# Patient Record
Sex: FEMALE | Race: WHITE | Employment: STUDENT | URBAN - METROPOLITAN AREA
[De-identification: names, ages, dates, MRNs, and addresses within clinical notes are randomized per-mention and may not be internally consistent; named-entity substitution may affect disease eponyms.]

---
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
## 2022-03-30 ENCOUNTER — OFFICE VISIT (OUTPATIENT)
Dept: INTERNAL MEDICINE CLINIC | Facility: CLINIC | Age: 22
End: 2022-03-30
Payer: MEDICAID

## 2022-03-30 VITALS
WEIGHT: 126.69 LBS | HEART RATE: 89 BPM | BODY MASS INDEX: 19.89 KG/M2 | HEIGHT: 67 IN | DIASTOLIC BLOOD PRESSURE: 62 MMHG | SYSTOLIC BLOOD PRESSURE: 100 MMHG | OXYGEN SATURATION: 99 %

## 2022-03-30 DIAGNOSIS — H91.93 DECREASED HEARING OF BOTH EARS: Primary | ICD-10-CM

## 2022-03-30 PROCEDURE — 3074F SYST BP LT 130 MM HG: CPT | Performed by: INTERNAL MEDICINE

## 2022-03-30 PROCEDURE — 3008F BODY MASS INDEX DOCD: CPT | Performed by: INTERNAL MEDICINE

## 2022-03-30 PROCEDURE — 3078F DIAST BP <80 MM HG: CPT | Performed by: INTERNAL MEDICINE

## 2022-03-30 PROCEDURE — 99213 OFFICE O/P EST LOW 20 MIN: CPT | Performed by: INTERNAL MEDICINE

## 2022-05-24 NOTE — TELEPHONE ENCOUNTER
Please review. Protocol failed / No protocol. Requested Prescriptions   Pending Prescriptions Disp Refills    sertraline 50 MG Oral Tab 90 tablet 1     Sig: Take 1 tablet (50 mg total) by mouth daily. Psychiatric Non-Scheduled (Anti-Anxiety) Passed - 5/24/2022  2:59 PM        Passed - Appointment in last 6 or next 3 months           minocycline 100 MG Oral Cap 180 capsule 1     Sig: Take 1 capsule (100 mg total) by mouth 2 (two) times daily.         There is no refill protocol information for this order           Future Appointments         Provider Department Appt Notes    In 1 week PHILLY Nguyen, [address], [employer] care/refills pt of Dr. Conway Distance partner policy informed to mother            Recent Outpatient Visits              1 month ago Decreased hearing of both Bal Jacques MD    Office Visit    1 year ago Encounter for medical examination to \[facility]"" care    [place] [facility], [facility], [address], Cate Da Silva MD    Office Visit    1 year ago Need for vaccination    [facility], [address], [place]    Nurse Only    1 year ago Acne vulgaris    [facility] Yvette Pennington MD    Office Visit    2 years ago Healthy child on routine physical examination    [place] [employer], [facility], [address] [facility], [place], [place]    Office Visit

## 2022-05-25 RX ORDER — MINOCYCLINE HYDROCHLORIDE 100 MG/1
100 CAPSULE ORAL 2 TIMES DAILY
Qty: 180 CAPSULE | Refills: 1 | Status: SHIPPED | OUTPATIENT
Start: 2022-05-25

## 2022-06-02 ENCOUNTER — OFFICE VISIT (OUTPATIENT)
Dept: INTERNAL MEDICINE CLINIC | Facility: CLINIC | Age: 22
End: 2022-06-02
Payer: MEDICAID

## 2022-06-02 VITALS
RESPIRATION RATE: 20 BRPM | DIASTOLIC BLOOD PRESSURE: 72 MMHG | HEIGHT: 67 IN | BODY MASS INDEX: 20.64 KG/M2 | SYSTOLIC BLOOD PRESSURE: 112 MMHG | WEIGHT: 131.5 LBS | HEART RATE: 105 BPM

## 2022-06-02 DIAGNOSIS — L70.9 ACNE, UNSPECIFIED ACNE TYPE: Primary | ICD-10-CM

## 2022-06-02 DIAGNOSIS — F33.40 RECURRENT MAJOR DEPRESSION IN REMISSION (HCC): ICD-10-CM

## 2022-06-02 PROBLEM — Z86.16 HISTORY OF 2019 NOVEL CORONAVIRUS DISEASE (COVID-19): Status: RESOLVED | Noted: 2020-11-27 | Resolved: 2022-06-02

## 2022-06-02 PROBLEM — F32.A DEPRESSION: Status: RESOLVED | Noted: 2020-11-27 | Resolved: 2022-06-02

## 2022-06-02 PROBLEM — Z00.00 ENCOUNTER FOR MEDICAL EXAMINATION TO ESTABLISH CARE: Status: RESOLVED | Noted: 2020-11-27 | Resolved: 2022-06-02

## 2022-06-02 RX ORDER — HYDROXYZINE HYDROCHLORIDE 25 MG/1
25 TABLET, FILM COATED ORAL EVERY 8 HOURS PRN
Qty: 30 TABLET | Refills: 5 | Status: SHIPPED | OUTPATIENT
Start: 2022-06-02

## 2022-06-02 RX ORDER — HYDROXYZINE HYDROCHLORIDE 10 MG/1
25 TABLET, FILM COATED ORAL
COMMUNITY
Start: 2022-01-22 | End: 2022-06-02

## 2022-06-02 RX ORDER — MINOCYCLINE HYDROCHLORIDE 100 MG/1
100 CAPSULE ORAL 2 TIMES DAILY
Qty: 180 CAPSULE | Refills: 1 | Status: SHIPPED | OUTPATIENT
Start: 2022-06-02

## 2022-10-10 DIAGNOSIS — L70.9 ACNE, UNSPECIFIED ACNE TYPE: ICD-10-CM

## 2022-10-10 DIAGNOSIS — F33.40 RECURRENT MAJOR DEPRESSION IN REMISSION (HCC): ICD-10-CM

## 2022-10-10 RX ORDER — HYDROXYZINE HYDROCHLORIDE 25 MG/1
25 TABLET, FILM COATED ORAL EVERY 8 HOURS PRN
Qty: 30 TABLET | Refills: 5 | Status: SHIPPED | OUTPATIENT
Start: 2022-10-10

## 2022-10-10 RX ORDER — MINOCYCLINE HYDROCHLORIDE 100 MG/1
100 CAPSULE ORAL 2 TIMES DAILY
Qty: 180 CAPSULE | Refills: 1 | Status: CANCELLED | OUTPATIENT
Start: 2022-10-10

## 2022-10-10 NOTE — TELEPHONE ENCOUNTER
Protocol failed or has No Protocol, please review  Requested Prescriptions   Pending Prescriptions Disp Refills    minocycline 100 MG Oral Cap 180 capsule 1     Sig: Take 1 capsule (100 mg total) by mouth 2 (two) times daily. There is no refill protocol information for this order        hydrOXYzine 25 MG Oral Tab 30 tablet 5     Sig: Take 1 tablet (25 mg total) by mouth every 8 (eight) hours as needed.         There is no refill protocol information for this order           Recent Outpatient Visits              4 months ago Acne, unspecified acne type    [facility], [address], Jessica Jane MD    Office Visit    6 months ago Decreased hearing of both Daniel Miles MD    Office Visit    1 year ago Encounter for medical examination to [facility], [address], [place]    Office Visit    2 years ago Need for vaccination    [facility], [address], [place]    Nurse Only    2 years ago Acne vulgaris    [facility] Eimly Gomez MD    Office Visit

## 2023-01-24 DIAGNOSIS — F33.40 RECURRENT MAJOR DEPRESSION IN REMISSION (HCC): ICD-10-CM

## 2023-01-25 NOTE — TELEPHONE ENCOUNTER
Dr Loza=please disregard, wrong sent. Re sent to the right provider-Dr Sharon Simental. CSS=please call and assists patient to schedule for FU OV for further medication refills. No future appointments. Please review; protocol failed/no protocol. Requested Prescriptions   Pending Prescriptions Disp Refills    sertraline 50 MG Oral Tab 135 tablet 1     Sig: Take 1.5 tablets (75 mg total) by mouth daily.        Psychiatric Non-Scheduled (Anti-Anxiety) Failed - 1/24/2023 11:18 AM        Failed - In person appointment or virtual visit in the past 6 mos or appointment in next 3 mos     Recent Outpatient Visits              7 months ago Acne, unspecified acne type    [facility], [address], Tulio Ewing MD    Office Visit    10 months ago Decreased hearing of both Chelsi Swedish, [address], MD Philipp    Office Visit    2 years ago Encounter for medical examination to establish care    Jaden Joaquin, Gallo Delgado MD    Office Visit    2 years ago Need for vaccination    [address], [address], [place]    Nurse Only    2 years ago Acne vulgaris    Sandra Gutierrez, [facility], MD    Office Visit                            Recent Outpatient Visits              7 months ago Acne, unspecified acne type    [address], [address], Tulio Ewing MD    Office Visit    10 months ago Decreased hearing of both Chelsi Swedish, [address], MD Philipp    Office Visit    2 years ago Encounter for medical examination to establish care    Jaden Joaquin, Gallo Delgado MD    Office Visit    2 years ago Need for vaccination    [address], [address], [place]    Nurse Only    2 years ago Acne vulgaris    [facility] Jennifer Taylor, Damien Huff MD    Office Visit

## 2023-07-10 NOTE — LETTER
Case discussed with [facility]  Luz Maria ([phone]).  Luz Maria explained that auth requests for inpatient admission and transport need to be separate, she will use the request submitted by this RN CM for the inpatient admission to [facility].  Case ID for this request will be [id number].  RN CM will need to submit another request for the air transport.  Luz Maria reported that air transport will likely be denied.    RN CM attempted to submit the auth request for air transport, but the Avail[employer] portal was down again.  KRISTOPHER TUTTLE notified Luz Maria at [facility].  RN CM also notified [facility] KRISTOPHER Vera.       VACCINE ADMINISTRATION RECORD  PARENT / GUARDIAN APPROVAL  Date: 3/9/2020  Vaccine administered to: Taya Bains     : 2000    MRN: [id number]    A copy of the appropriate Centers for Disease Control and Prevention Vaccine Information statement

## 2024-05-24 ENCOUNTER — OFFICE VISIT (OUTPATIENT)
Dept: OTOLARYNGOLOGY | Facility: CLINIC | Age: 24
End: 2024-05-24

## 2024-05-24 DIAGNOSIS — R07.0 THROAT PAIN: Primary | ICD-10-CM

## 2024-05-24 PROCEDURE — 31575 DIAGNOSTIC LARYNGOSCOPY: CPT | Performed by: OTOLARYNGOLOGY

## 2024-05-24 PROCEDURE — 99203 OFFICE O/P NEW LOW 30 MIN: CPT | Performed by: OTOLARYNGOLOGY

## 2024-05-24 RX ORDER — MONTELUKAST SODIUM 10 MG/1
10 TABLET ORAL NIGHTLY
Qty: 30 TABLET | Refills: 3 | Status: SHIPPED | OUTPATIENT
Start: 2024-05-24

## 2024-05-24 RX ORDER — AZELASTINE 1 MG/ML
2 SPRAY, METERED NASAL 2 TIMES DAILY
Qty: 30 ML | Refills: 0 | Status: SHIPPED | OUTPATIENT
Start: 2024-05-24

## 2024-05-24 NOTE — PROGRESS NOTES
Bre Maynard is a 24 year old female.    Chief Complaint   Patient presents with    Tonsil Problem     Reports enlarged tonsils, when enlarged feels like swallowing glass       HISTORY OF PRESENT ILLNESS  She presents with a history of being in [place] for college returned recently.  States that while she was out there very affected by pollen.  Developed sore throat went to immediate care at the college was started on antibiotics despite negative COVID and strep testing with no help in her symptoms throat pain continues.  Significant allergy history had some postnasal discharge while throat clearing and cough.  No fevers or other associated signs or symptoms.  She does smoke and vape and has been doing so consistently throughout this entire process.  Here for evaluation and management      Social History     Socioeconomic History    Marital status: Single   Tobacco Use    Smoking status: Never    Smokeless tobacco: Never   Vaping Use    Vaping status: Some Days    Substances: Nicotine, CBD   Substance and Sexual Activity    Alcohol use: Yes    Drug use: Yes     Types: Cannabis    Sexual activity: Yes     Birth control/protection: Condom, Pill   Other Topics Concern    Second-hand smoke exposure No    Alcohol/drug concerns No    Violence concerns No    Pt has a pacemaker No    Pt has a defibrillator No    Reaction to local anesthetic No       Family History   Problem Relation Age of Onset    Migraines Mother     Alcohol and Other Disorders Associated Father     Diabetes Maternal Grandmother     Glaucoma Maternal Grandmother     Heart Disease Maternal Grandfather     Heart Disorder Maternal Grandfather     Heart Disease Paternal Grandfather     Cancer Neg     Hypertension Neg        Past Medical History:    Acute bronchospasm    Congenital hypertrophy of retinal pigment epithelium    per NG; Congenital hypertrophy of RPE    Gross hematuria    Hyperopia    Subjective vision disturbance       History reviewed. No  pertinent surgical history.      REVIEW OF SYSTEMS    System Neg/Pos Details   Constitutional Negative Fatigue, fever and weight loss.   ENMT Negative Drooling.   Eyes Negative Blurred vision and vision changes.   Respiratory Negative Dyspnea and wheezing.   Cardio Negative Chest pain, irregular heartbeat/palpitations and syncope.   GI Negative Abdominal pain and diarrhea.   Endocrine Negative Cold intolerance and heat intolerance.   Neuro Negative Tremors.   Psych Negative Anxiety and depression.   Integumentary Negative Frequent skin infections, pigment change and rash.   Hema/Lymph Negative Easy bleeding and easy bruising.           PHYSICAL EXAM    There were no vitals taken for this visit.       Constitutional Normal Overall appearance - Normal.   Psychiatric Normal Orientation - Oriented to time, place, person & situation. Appropriate mood and affect.   Neck Exam Normal Inspection - Normal. Palpation - Normal. Parotid gland - Normal. Thyroid gland - Normal.   Eyes Normal Conjunctiva - Right: Normal, Left: Normal. Pupil - Right: Normal, Left: Normal. Fundus - Right: Normal, Left: Normal.   Neurological Normal Memory - Normal. Cranial nerves - Cranial nerves II through XII grossly intact.   Head/Face Normal Facial features - Normal. Eyebrows - Normal. Skull - Normal.        Nasopharynx Normal External nose - Normal. Lips/teeth/gums - Normal. Tonsils - Normal. Oropharynx - Normal.   Ears Normal Inspection - Right: Normal, Left: Normal. Canal - Right: Normal, Left: Normal. TM - Right: Normal, Left: Normal.   Skin Normal Inspection - Normal.        Lymph Detail Normal Submental. Submandibular. Anterior cervical. Posterior cervical. Supraclavicular.        Nose/Mouth/Throat Normal External nose - Normal. Lips/teeth/gums - Normal. Tonsils - Normal. Oropharynx -posterior pharyngeal erythema   Nose/Mouth/Throat Normal Nares - Right: Normal Left: Normal. Septum -Normal  Turbinates - Right: Normal, Left: Normal.  Nasal  mucosa congested   Procedures:  Endoscopy/Laryngoscopy  Pre-Procedure Care: Verbal consent was obtained. Procedure/risks were explained. Questions were answered. Correct patient identified. Correct side and site confirmed.      A topical spray of ).25% Neosynephrine was sprayed into the nose.    Laryngoscopy:  Flexible Fiberoptic Laryngoscopy: A diagnostic flexible fiberoptic laryngoscopy was performed. The flexible fiberoptic laryngoscope was placed into the nose or mouthand advanced  into the interior of the larynx. A thorough examination of the interior of the larynx was performed.   Findings were as follows.       Hypopharynx/Larynx:  Epiglottis is normal.  Arytenoids:  Bilateral: Arytenoids are normal.  Vocal folds-false  Bilateral: Vocal folds (false) are normal.  Vocal folds-true  Bilateral: Vocal folds (true) are normal.  Pyriform sinus:  Bilateral: Pyriform sinuses are normal.  Base of tongue is normal in appearance.  There is no airway obstruction.   General comments: Erythema of the laryngeal structures no lesions masses polyps or nodules.              Current Outpatient Medications:     loratadine-pseudoephedrine ER 5-120 MG Oral Tablet 12 Hr, Take 1 tablet by mouth every 12 (twelve) hours., Disp: 60 tablet, Rfl: 3    montelukast 10 MG Oral Tab, Take 1 tablet (10 mg total) by mouth nightly., Disp: 30 tablet, Rfl: 3    azelastine 0.1 % Nasal Solution, 2 sprays by Nasal route 2 (two) times daily., Disp: 30 mL, Rfl: 0    sertraline 50 MG Oral Tab, Take 1.5 tablets (75 mg total) by mouth daily., Disp: 145 tablet, Rfl: 1    hydrOXYzine 25 MG Oral Tab, Take 1 tablet (25 mg total) by mouth every 8 (eight) hours as needed., Disp: 30 tablet, Rfl: 5    Norgestimate-Eth Estradiol (JOHN) 0.25-35 MG-MCG Oral Tab, Take 1 tablet by mouth daily., Disp: 84 tablet, Rfl: 0    minocycline 100 MG Oral Cap, Take 1 capsule (100 mg total) by mouth 2 (two) times daily. (Patient not taking: Reported on 5/24/2024), Disp: 180  capsule, Rfl: 1  ASSESSMENT AND PLAN    1. Throat pain  Laryngoscopy reveals erythema of the laryngeal structures but no lesions masses polyps or nodules.  I did recommend that she stop vaping and smoking altogether and to start Claritin-D Singulair Astelin nasal spray.  She will return to see me in 1 month for reevaluation.  May be able to wean over the summer months but most likely will require these meds in the allergy months here in [facility].  - loratadine-pseudoephedrine ER 5-120 MG Oral Tablet 12 Hr; Take 1 tablet by mouth every 12 (twelve) hours.  Dispense: 60 tablet; Refill: 3  - LARYNGOSCOPY,FLEX FIBER,DIAGNOSTIC        This note was prepared using Dragon Medical voice recognition dictation software. As a result errors may occur. When identified these errors have been corrected. While every attempt is made to correct errors during dictation discrepancies may still exist    Martin Obregon MD    5/24/2024    2:32 PM

## 2024-07-25 ENCOUNTER — APPOINTMENT (OUTPATIENT)
Dept: URBAN - METROPOLITAN AREA CLINIC 244 | Age: 24
Setting detail: DERMATOLOGY
End: 2024-07-25

## 2024-07-25 DIAGNOSIS — L70.0 ACNE VULGARIS: ICD-10-CM

## 2024-07-25 PROCEDURE — OTHER PRESCRIPTION MEDICATION MANAGEMENT: OTHER

## 2024-07-25 PROCEDURE — OTHER COUNSELING: OTHER

## 2024-07-25 PROCEDURE — OTHER PRESCRIPTION: OTHER

## 2024-07-25 PROCEDURE — 99204 OFFICE O/P NEW MOD 45 MIN: CPT

## 2024-07-25 RX ORDER — SPIRONOLACTONE 100 MG/1
TABLET, FILM COATED ORAL
Qty: 30 | Refills: 2 | Status: ERX | COMMUNITY
Start: 2024-07-25

## 2024-07-25 RX ORDER — TRETINOIN 0.6 MG/G
GEL TOPICAL
Qty: 50 | Refills: 11 | Status: ERX | COMMUNITY
Start: 2024-07-25

## 2024-07-25 RX ORDER — CLINDAMYCIN PHOSPHATE 10 MG/ML
LOTION TOPICAL DAILY
Qty: 60 | Refills: 11 | Status: ERX | COMMUNITY
Start: 2024-07-25

## 2024-07-25 ASSESSMENT — LOCATION DETAILED DESCRIPTION DERM
LOCATION DETAILED: LEFT CHIN
LOCATION DETAILED: GLABELLA

## 2024-07-25 ASSESSMENT — LOCATION ZONE DERM: LOCATION ZONE: FACE

## 2024-07-25 ASSESSMENT — LOCATION SIMPLE DESCRIPTION DERM
LOCATION SIMPLE: CHIN
LOCATION SIMPLE: GLABELLA

## 2024-07-25 NOTE — PROCEDURE: PRESCRIPTION MEDICATION MANAGEMENT
Render In Strict Bullet Format?: No
Detail Level: Zone
Modify Regimen: Patient previously on Spironolactone 50mg —> increase to 100mg
Continue Regimen: Retin-A 0.06% QHS & Clindamycin 1% QAM

## 2024-07-25 NOTE — PROCEDURE: COUNSELING
Aklief counseling:  Patient advised to apply a pea-sized amount only at bedtime and wait 30 minutes after washing their face before applying.  If too drying, patient may add a non-comedogenic moisturizer.  The most commonly reported side effects including irritation, redness, scaling, dryness, stinging, burning, itching, and increased risk of sunburn.  The patient verbalized understanding of the proper use and possible adverse effects of retinoids.  All of the patient's questions and concerns were addressed.
Bpo Recommendations: Recommend panoxyl or cerave 4% BPO wash on face for 30-45 seconds daily or as tolerated (reduce use if drying/irritating to face). Can bleach fabrics/hair. Recommend 10% BPO wash (i.e panoxyl) for acne on chest/back for 2-3 minutes daily, adjusting frequency/duration as tolerated.
Minocycline Pregnancy And Lactation Text: This medication is Pregnancy Category D and not consider safe during pregnancy. It is also excreted in breast milk.
Topical Retinoid Pregnancy And Lactation Text: This medication is Pregnancy Category C. It is unknown if this medication is excreted in breast milk.
Winlevi Counseling:  I discussed with the patient the risks of topical clascoterone including but not limited to erythema, scaling, itching, and stinging. Patient voiced their understanding.
Erythromycin Counseling:  I discussed with the patient the risks of erythromycin including but not limited to GI upset, allergic reaction, drug rash, diarrhea, increase in liver enzymes, and yeast infections.
Sunscreen Recommendations: [place] roche posay or EltaMD are well tolerated sunscreens.
Bactrim Pregnancy And Lactation Text: This medication is Pregnancy Category D and is known to cause fetal risk.  It is also excreted in breast milk.
Azelaic Acid Counseling: Patient counseled that medicine may cause skin irritation and to avoid applying near the eyes.  In the event of skin irritation, the patient was advised to reduce the amount of the drug applied or use it less frequently.   The patient verbalized understanding of the proper use and possible adverse effects of azelaic acid.  All of the patient's questions and concerns were addressed.
Doxycycline Counseling:  Patient counseled regarding possible photosensitivity and increased risk for sunburn.  Patient instructed to avoid sunlight, if possible.  When exposed to sunlight, patients should wear protective clothing, sunglasses, and sunscreen.  The patient was instructed to call the office immediately if the following severe adverse effects occur:  hearing changes, easy bruising/bleeding, severe headache, or vision changes.  The patient verbalized understanding of the proper use and possible adverse effects of doxycycline.  All of the patient's questions and concerns were addressed.
Azithromycin Pregnancy And Lactation Text: This medication is considered safe during pregnancy and is also secreted in breast milk.
High Dose Vitamin A Pregnancy And Lactation Text: High dose vitamin A therapy is contraindicated during pregnancy and breast feeding.
Detail Level: Zone
Tazorac Pregnancy And Lactation Text: This medication is not safe during pregnancy. It is unknown if this medication is excreted in breast milk.
Benzoyl Peroxide Counseling: Patient counseled that medicine may cause skin irritation and bleach clothing.  In the event of skin irritation, the patient was advised to reduce the amount of the drug applied or use it less frequently.   The patient verbalized understanding of the proper use and possible adverse effects of benzoyl peroxide.  All of the patient's questions and concerns were addressed.
Topical Clindamycin Pregnancy And Lactation Text: This medication is Pregnancy Category B and is considered safe during pregnancy. It is unknown if it is excreted in breast milk.
Spironolactone Pregnancy And Lactation Text: This medication can cause feminization of the male fetus and should be avoided during pregnancy. The active metabolite is also found in breast milk.
Dapsone Counseling: I discussed with the patient the risks of dapsone including but not limited to hemolytic anemia, agranulocytosis, rashes, methemoglobinemia, kidney failure, peripheral neuropathy, headaches, GI upset, and liver toxicity.  Patients who start dapsone require monitoring including baseline LFTs and weekly CBCs for the first month, then every month thereafter.  The patient verbalized understanding of the proper use and possible adverse effects of dapsone.  All of the patient's questions and concerns were addressed.
Isotretinoin Pregnancy And Lactation Text: This medication is Pregnancy Category X and is considered extremely dangerous during pregnancy. It is unknown if it is excreted in breast milk.
Topical Retinoid counseling:  Patient advised to apply a pea-sized amount only at bedtime and wait 30 minutes after washing their face before applying.  If too drying, patient may add a non-comedogenic moisturizer. The patient verbalized understanding of the proper use and possible adverse effects of retinoids.  All of the patient's questions and concerns were addressed.
Topical Sulfur Applications Pregnancy And Lactation Text: This medication is Pregnancy Category C and has an unknown safety profile during pregnancy. It is unknown if this topical medication is excreted in breast milk.
Birth Control Pills Counseling: Birth Control Pill Counseling: I discussed with the patient the potential side effects of OCPs including but not limited to increased risk of stroke, heart attack, thrombophlebitis, deep venous thrombosis, hepatic adenomas, breast changes, GI upset, headaches, and depression.  The patient verbalized understanding of the proper use and possible adverse effects of OCPs. All of the patient's questions and concerns were addressed.
Erythromycin Pregnancy And Lactation Text: This medication is Pregnancy Category B and is considered safe during pregnancy. It is also excreted in breast milk.
Include Pregnancy/Lactation Warning?: No
Sarecycline Counseling: Patient advised regarding possible photosensitivity and discoloration of the teeth, skin, lips, tongue and gums.  Patient instructed to avoid sunlight, if possible.  When exposed to sunlight, patients should wear protective clothing, sunglasses, and sunscreen.  The patient was instructed to call the office immediately if the following severe adverse effects occur:  hearing changes, easy bruising/bleeding, severe headache, or vision changes.  The patient verbalized understanding of the proper use and possible adverse effects of sarecycline.  All of the patient's questions and concerns were addressed.
Minocycline Counseling: Patient advised regarding possible photosensitivity and discoloration of the teeth, skin, lips, tongue and gums.  Patient instructed to avoid sunlight, if possible.  When exposed to sunlight, patients should wear protective clothing, sunglasses, and sunscreen.  The patient was instructed to call the office immediately if the following severe adverse effects occur:  hearing changes, easy bruising/bleeding, severe headache, or vision changes.  The patient verbalized understanding of the proper use and possible adverse effects of minocycline.  All of the patient's questions and concerns were addressed.
Doxycycline Pregnancy And Lactation Text: This medication is Pregnancy Category D and not consider safe during pregnancy. It is also excreted in breast milk but is considered safe for shorter treatment courses.
Tazorac Counseling:  Patient advised that medication is irritating and drying.  Patient may need to apply sparingly and wash off after an hour before eventually leaving it on overnight.  The patient verbalized understanding of the proper use and possible adverse effects of tazorac.  All of the patient's questions and concerns were addressed.
Winlevi Pregnancy And Lactation Text: This medication is considered safe during pregnancy and breastfeeding.
Aklief Pregnancy And Lactation Text: It is unknown if this medication is safe to use during pregnancy.  It is unknown if this medication is excreted in breast milk.  Breastfeeding women should use the topical cream on the smallest area of the skin for the shortest time needed while breastfeeding.  Do not apply to nipple and areola.
Spironolactone Counseling: Patient advised regarding risks of diarrhea, abdominal pain, hyperkalemia, birth defects (for female patients), liver toxicity and renal toxicity. The patient may need blood work to monitor liver and kidney function and potassium levels while on therapy. The patient verbalized understanding of the proper use and possible adverse effects of spironolactone.  All of the patient's questions and concerns were addressed.
Bactrim Counseling:  I discussed with the patient the risks of sulfa antibiotics including but not limited to GI upset, allergic reaction, drug rash, diarrhea, dizziness, photosensitivity, and yeast infections.  Rarely, more serious reactions can occur including but not limited to aplastic anemia, agranulocytosis, methemoglobinemia, blood dyscrasias, liver or kidney failure, lung infiltrates or desquamative/blistering drug rashes.
Topical Clindamycin Counseling: Patient counseled that this medication may cause skin irritation or allergic reactions.  In the event of skin irritation, the patient was advised to reduce the amount of the drug applied or use it less frequently.   The patient verbalized understanding of the proper use and possible adverse effects of clindamycin.  All of the patient's questions and concerns were addressed.
Azelaic Acid Pregnancy And Lactation Text: This medication is considered safe during pregnancy and breast feeding.
High Dose Vitamin A Counseling: Side effects reviewed, pt to contact office should one occur.
Azithromycin Counseling:  I discussed with the patient the risks of azithromycin including but not limited to GI upset, allergic reaction, drug rash, diarrhea, and yeast infections.
Dapsone Pregnancy And Lactation Text: This medication is Pregnancy Category C and is not considered safe during pregnancy or breast feeding.
Tetracycline Counseling: Patient counseled regarding possible photosensitivity and increased risk for sunburn.  Patient instructed to avoid sunlight, if possible.  When exposed to sunlight, patients should wear protective clothing, sunglasses, and sunscreen.  The patient was instructed to call the office immediately if the following severe adverse effects occur:  hearing changes, easy bruising/bleeding, severe headache, or vision changes.  The patient verbalized understanding of the proper use and possible adverse effects of tetracycline.  All of the patient's questions and concerns were addressed. Patient understands to avoid pregnancy while on therapy due to potential birth defects.
Topical Retinoids Recommendations: Rec OTC adapalene gel 0.1% (i.e differin) if Rx is not covered.
Benzoyl Peroxide Pregnancy And Lactation Text: This medication is Pregnancy Category C. It is unknown if benzoyl peroxide is excreted in breast milk.
Topical Sulfur Applications Counseling: Topical Sulfur Counseling: Patient counseled that this medication may cause skin irritation or allergic reactions.  In the event of skin irritation, the patient was advised to reduce the amount of the drug applied or use it less frequently.   The patient verbalized understanding of the proper use and possible adverse effects of topical sulfur application.  All of the patient's questions and concerns were addressed.
Isotretinoin Counseling: Patient should get monthly blood tests, not donate blood, not drive at night if vision affected, not share medication, and not undergo elective surgery for 6 months after tx completed. Side effects reviewed, pt to contact office should one occur.
Birth Control Pills Pregnancy And Lactation Text: This medication should be avoided if pregnant and for the first 30 days post-partum.

## 2024-10-28 ENCOUNTER — APPOINTMENT (OUTPATIENT)
Dept: URBAN - METROPOLITAN AREA CLINIC 244 | Age: 24
Setting detail: DERMATOLOGY
End: 2024-10-28

## 2024-10-28 DIAGNOSIS — L70.0 ACNE VULGARIS: ICD-10-CM

## 2024-10-28 PROCEDURE — OTHER DIAGNOSIS COMMENT: OTHER

## 2024-10-28 PROCEDURE — OTHER PRESCRIPTION: OTHER

## 2024-10-28 PROCEDURE — OTHER PRESCRIPTION MEDICATION MANAGEMENT: OTHER

## 2024-10-28 PROCEDURE — OTHER MIPS QUALITY: OTHER

## 2024-10-28 PROCEDURE — 99214 OFFICE O/P EST MOD 30 MIN: CPT

## 2024-10-28 PROCEDURE — OTHER COUNSELING: OTHER

## 2024-10-28 RX ORDER — TRETIONIN 0.25 MG/G
CREAM TOPICAL QHS
Qty: 45 | Refills: 11 | Status: ERX | COMMUNITY
Start: 2024-10-28

## 2024-10-28 RX ORDER — SPIRONOLACTONE 100 MG/1
TABLET, FILM COATED ORAL
Qty: 30 | Refills: 3 | Status: ERX

## 2024-10-28 RX ORDER — CLINDAMYCIN PHOSPHATE 10 MG/ML
LOTION TOPICAL DAILY
Qty: 60 | Refills: 11 | Status: ACTIVE

## 2024-10-28 ASSESSMENT — LOCATION DETAILED DESCRIPTION DERM
LOCATION DETAILED: GLABELLA
LOCATION DETAILED: LEFT CHIN

## 2024-10-28 ASSESSMENT — LOCATION SIMPLE DESCRIPTION DERM
LOCATION SIMPLE: CHIN
LOCATION SIMPLE: GLABELLA

## 2024-10-28 ASSESSMENT — LOCATION ZONE DERM: LOCATION ZONE: FACE

## 2024-10-28 NOTE — PROCEDURE: PRESCRIPTION MEDICATION MANAGEMENT
Initiate Treatment: [id number]\\ntopical tret 0.025%
Render In Strict Bullet Format?: No
Detail Level: Zone

## 2024-10-28 NOTE — PROCEDURE: DIAGNOSIS COMMENT
Detail Level: Simple
Render Risk Assessment In Note?: no
Comment: Will consider triple cream at next visit in about 6 weeks for glabella, chin

## 2024-12-11 ENCOUNTER — APPOINTMENT (OUTPATIENT)
Dept: URBAN - METROPOLITAN AREA CLINIC 244 | Age: 24
Setting detail: DERMATOLOGY
End: 2024-12-11

## 2024-12-11 DIAGNOSIS — D485 NEOPLASM OF UNCERTAIN BEHAVIOR OF SKIN: ICD-10-CM

## 2024-12-11 DIAGNOSIS — L90.5 SCAR CONDITIONS AND FIBROSIS OF SKIN: ICD-10-CM

## 2024-12-11 DIAGNOSIS — L70.0 ACNE VULGARIS: ICD-10-CM

## 2024-12-11 DIAGNOSIS — L72.0 EPIDERMAL CYST: ICD-10-CM

## 2024-12-11 PROBLEM — D48.5 NEOPLASM OF UNCERTAIN BEHAVIOR OF SKIN: Status: ACTIVE | Noted: 2024-12-11

## 2024-12-11 PROCEDURE — OTHER DIAGNOSIS COMMENT: OTHER

## 2024-12-11 PROCEDURE — 99213 OFFICE O/P EST LOW 20 MIN: CPT | Mod: 25

## 2024-12-11 PROCEDURE — OTHER BIOPSY BY SHAVE METHOD: OTHER

## 2024-12-11 PROCEDURE — OTHER PRESCRIPTION MEDICATION MANAGEMENT: OTHER

## 2024-12-11 PROCEDURE — 11102 TANGNTL BX SKIN SINGLE LES: CPT

## 2024-12-11 PROCEDURE — OTHER COUNSELING: OTHER

## 2024-12-11 ASSESSMENT — LOCATION DETAILED DESCRIPTION DERM
LOCATION DETAILED: GLABELLA
LOCATION DETAILED: LEFT CHIN
LOCATION DETAILED: NASAL DORSUM
LOCATION DETAILED: EPIGASTRIC SKIN
LOCATION DETAILED: RIGHT SUPERIOR MEDIAL MALAR CHEEK

## 2024-12-11 ASSESSMENT — LOCATION SIMPLE DESCRIPTION DERM
LOCATION SIMPLE: RIGHT CHEEK
LOCATION SIMPLE: GLABELLA
LOCATION SIMPLE: ABDOMEN
LOCATION SIMPLE: NOSE
LOCATION SIMPLE: CHIN

## 2024-12-11 ASSESSMENT — LOCATION ZONE DERM
LOCATION ZONE: NOSE
LOCATION ZONE: FACE
LOCATION ZONE: TRUNK

## 2024-12-11 NOTE — PROCEDURE: PRESCRIPTION MEDICATION MANAGEMENT
Render In Strict Bullet Format?: No
Detail Level: Zone
Continue Regimen: spironolactone 100 mg tablet \\nQuantity: 30.0 Tablet  Days Supply: 30\\nSig: Take one tablet by mouth once a day\\n\\nclindamycin 1 % lotion daily\\nQuantity: 60.0 ml\\nSig: Apply to acne prone area every morning\\n\\ntretinoin 0.025 % topical cream QHS\\nQuantity: 45.0 g  Days Supply: 30\\nSig: Apply pea size amount to prevent acne in acne-prone areas, at night.

## 2024-12-11 NOTE — PROCEDURE: BIOPSY BY SHAVE METHOD
Type Of Destruction Used: Curettage
Hide Anticipated Plan (Based On Presumed Biopsy Results)?: No
Billing Type: Third-Party Bill
Electrodesiccation And Curettage Text: The wound bed was treated with electrodesiccation and curettage after the biopsy was performed.
Consent: Written consent was obtained and risks were reviewed including but not limited to scarring, infection, bleeding, scabbing, incomplete removal, nerve damage and allergy to anesthesia.
Post-Care Instructions: I reviewed with the patient in detail post-care instructions. Patient is to keep the biopsy site dry overnight, and then apply bacitracin twice daily until healed. Patient may apply hydrogen peroxide soaks to remove any crusting.
Detail Level: Detailed
Cryotherapy Text: The wound bed was treated with cryotherapy after the biopsy was performed.
Curettage Text: The wound bed was treated with curettage after the biopsy was performed.
Silver Nitrate Text: The wound bed was treated with silver nitrate after the biopsy was performed.
Was A Bandage Applied: Yes
Depth Of Biopsy: dermis
Lab: [id number]
Biopsy Type: H and E
Anesthesia Volume In Cc: 0.5
Additional Anesthesia Volume In Cc (Will Not Render If 0): 0
Information: Selecting Yes will display possible errors in your note based on the variables you have selected. This validation is only offered as a suggestion for you. PLEASE NOTE THAT THE VALIDATION TEXT WILL BE REMOVED WHEN YOU FINALIZE YOUR NOTE. IF YOU WANT TO FAX A PRELIMINARY NOTE YOU WILL NEED TO TOGGLE THIS TO 'NO' IF YOU DO NOT WANT IT IN YOUR FAXED NOTE.
Wound Care: Petrolatum
Anesthesia Type: 0.5% lidocaine with 1:100,000 epinephrine and a 1:10 solution of 8.4% sodium bicarbonate
Biopsy Method: double edge Personna blade
Notification Instructions: Patient will be notified of biopsy results. However, patient instructed to call the office if not contacted within 2 weeks.
Hemostasis: Drysol
Dressing: bandage
Electrodesiccation Text: The wound bed was treated with electrodesiccation after the biopsy was performed.

## 2024-12-11 NOTE — PROCEDURE: COUNSELING
Aklief counseling:  Patient advised to apply a pea-sized amount only at bedtime and wait 30 minutes after washing their face before applying.  If too drying, patient may add a non-comedogenic moisturizer.  The most commonly reported side effects including irritation, redness, scaling, dryness, stinging, burning, itching, and increased risk of sunburn.  The patient verbalized understanding of the proper use and possible adverse effects of retinoids.  All of the patient's questions and concerns were addressed.
Bpo Recommendations: Recommend panoxyl or cerave 4% BPO wash on face for 30-45 seconds daily or as tolerated (reduce use if drying/irritating to face). Can bleach fabrics/hair. Recommend 10% BPO wash (i.e panoxyl) for acne on chest/back for 2-3 minutes daily, adjusting frequency/duration as tolerated.
Minocycline Pregnancy And Lactation Text: This medication is Pregnancy Category D and not consider safe during pregnancy. It is also excreted in breast milk.
Topical Retinoid Pregnancy And Lactation Text: This medication is Pregnancy Category C. It is unknown if this medication is excreted in breast milk.
Winlevi Counseling:  I discussed with the patient the risks of topical clascoterone including but not limited to erythema, scaling, itching, and stinging. Patient voiced their understanding.
Erythromycin Counseling:  I discussed with the patient the risks of erythromycin including but not limited to GI upset, allergic reaction, drug rash, diarrhea, increase in liver enzymes, and yeast infections.
Sunscreen Recommendations: La roche posay or EltaMD are well tolerated sunscreens.
Bactrim Pregnancy And Lactation Text: This medication is Pregnancy Category D and is known to cause fetal risk.  It is also excreted in breast milk.
Azelaic Acid Counseling: Patient counseled that medicine may cause skin irritation and to avoid applying near the eyes.  In the event of skin irritation, the patient was advised to reduce the amount of the drug applied or use it less frequently.   The patient verbalized understanding of the proper use and possible adverse effects of azelaic acid.  All of the patient's questions and concerns were addressed.
Doxycycline Counseling:  Patient counseled regarding possible photosensitivity and increased risk for sunburn.  Patient instructed to avoid sunlight, if possible.  When exposed to sunlight, patients should wear protective clothing, sunglasses, and sunscreen.  The patient was instructed to call the office immediately if the following severe adverse effects occur:  hearing changes, easy bruising/bleeding, severe headache, or vision changes.  The patient verbalized understanding of the proper use and possible adverse effects of doxycycline.  All of the patient's questions and concerns were addressed.
Azithromycin Pregnancy And Lactation Text: This medication is considered safe during pregnancy and is also secreted in breast milk.
High Dose Vitamin A Pregnancy And Lactation Text: High dose vitamin A therapy is contraindicated during pregnancy and breast feeding.
Detail Level: Zone
Tazorac Pregnancy And Lactation Text: This medication is not safe during pregnancy. It is unknown if this medication is excreted in breast milk.
Benzoyl Peroxide Counseling: Patient counseled that medicine may cause skin irritation and bleach clothing.  In the event of skin irritation, the patient was advised to reduce the amount of the drug applied or use it less frequently.   The patient verbalized understanding of the proper use and possible adverse effects of benzoyl peroxide.  All of the patient's questions and concerns were addressed.
Topical Clindamycin Pregnancy And Lactation Text: This medication is Pregnancy Category B and is considered safe during pregnancy. It is unknown if it is excreted in breast milk.
Spironolactone Pregnancy And Lactation Text: This medication can cause feminization of the male fetus and should be avoided during pregnancy. The active metabolite is also found in breast milk.
Dapsone Counseling: I discussed with the patient the risks of dapsone including but not limited to hemolytic anemia, agranulocytosis, rashes, methemoglobinemia, kidney failure, peripheral neuropathy, headaches, GI upset, and liver toxicity.  Patients who start dapsone require monitoring including baseline LFTs and weekly CBCs for the first month, then every month thereafter.  The patient verbalized understanding of the proper use and possible adverse effects of dapsone.  All of the patient's questions and concerns were addressed.
Isotretinoin Pregnancy And Lactation Text: This medication is Pregnancy Category X and is considered extremely dangerous during pregnancy. It is unknown if it is excreted in breast milk.
Topical Retinoid counseling:  Patient advised to apply a pea-sized amount only at bedtime and wait 30 minutes after washing their face before applying.  If too drying, patient may add a non-comedogenic moisturizer. The patient verbalized understanding of the proper use and possible adverse effects of retinoids.  All of the patient's questions and concerns were addressed.
Topical Sulfur Applications Pregnancy And Lactation Text: This medication is Pregnancy Category C and has an unknown safety profile during pregnancy. It is unknown if this topical medication is excreted in breast milk.
Birth Control Pills Counseling: Birth Control Pill Counseling: I discussed with the patient the potential side effects of OCPs including but not limited to increased risk of stroke, heart attack, thrombophlebitis, deep venous thrombosis, hepatic adenomas, breast changes, GI upset, headaches, and depression.  The patient verbalized understanding of the proper use and possible adverse effects of OCPs. All of the patient's questions and concerns were addressed.
Erythromycin Pregnancy And Lactation Text: This medication is Pregnancy Category B and is considered safe during pregnancy. It is also excreted in breast milk.
Include Pregnancy/Lactation Warning?: No
Sarecycline Counseling: Patient advised regarding possible photosensitivity and discoloration of the teeth, skin, lips, tongue and gums.  Patient instructed to avoid sunlight, if possible.  When exposed to sunlight, patients should wear protective clothing, sunglasses, and sunscreen.  The patient was instructed to call the office immediately if the following severe adverse effects occur:  hearing changes, easy bruising/bleeding, severe headache, or vision changes.  The patient verbalized understanding of the proper use and possible adverse effects of sarecycline.  All of the patient's questions and concerns were addressed.
Minocycline Counseling: Patient advised regarding possible photosensitivity and discoloration of the teeth, skin, lips, tongue and gums.  Patient instructed to avoid sunlight, if possible.  When exposed to sunlight, patients should wear protective clothing, sunglasses, and sunscreen.  The patient was instructed to call the office immediately if the following severe adverse effects occur:  hearing changes, easy bruising/bleeding, severe headache, or vision changes.  The patient verbalized understanding of the proper use and possible adverse effects of minocycline.  All of the patient's questions and concerns were addressed.
Doxycycline Pregnancy And Lactation Text: This medication is Pregnancy Category D and not consider safe during pregnancy. It is also excreted in breast milk but is considered safe for shorter treatment courses.
Tazorac Counseling:  Patient advised that medication is irritating and drying.  Patient may need to apply sparingly and wash off after an hour before eventually leaving it on overnight.  The patient verbalized understanding of the proper use and possible adverse effects of tazorac.  All of the patient's questions and concerns were addressed.
Winlevi Pregnancy And Lactation Text: This medication is considered safe during pregnancy and breastfeeding.
Aklief Pregnancy And Lactation Text: It is unknown if this medication is safe to use during pregnancy.  It is unknown if this medication is excreted in breast milk.  Breastfeeding women should use the topical cream on the smallest area of the skin for the shortest time needed while breastfeeding.  Do not apply to nipple and areola.
Spironolactone Counseling: Patient advised regarding risks of diarrhea, abdominal pain, hyperkalemia, birth defects (for female patients), liver toxicity and renal toxicity. The patient may need blood work to monitor liver and kidney function and potassium levels while on therapy. The patient verbalized understanding of the proper use and possible adverse effects of spironolactone.  All of the patient's questions and concerns were addressed.
Bactrim Counseling:  I discussed with the patient the risks of sulfa antibiotics including but not limited to GI upset, allergic reaction, drug rash, diarrhea, dizziness, photosensitivity, and yeast infections.  Rarely, more serious reactions can occur including but not limited to aplastic anemia, agranulocytosis, methemoglobinemia, blood dyscrasias, liver or kidney failure, lung infiltrates or desquamative/blistering drug rashes.
Topical Clindamycin Counseling: Patient counseled that this medication may cause skin irritation or allergic reactions.  In the event of skin irritation, the patient was advised to reduce the amount of the drug applied or use it less frequently.   The patient verbalized understanding of the proper use and possible adverse effects of clindamycin.  All of the patient's questions and concerns were addressed.
Azelaic Acid Pregnancy And Lactation Text: This medication is considered safe during pregnancy and breast feeding.
High Dose Vitamin A Counseling: Side effects reviewed, pt to contact office should one occur.
Azithromycin Counseling:  I discussed with the patient the risks of azithromycin including but not limited to GI upset, allergic reaction, drug rash, diarrhea, and yeast infections.
Dapsone Pregnancy And Lactation Text: This medication is Pregnancy Category C and is not considered safe during pregnancy or breast feeding.
Tetracycline Counseling: Patient counseled regarding possible photosensitivity and increased risk for sunburn.  Patient instructed to avoid sunlight, if possible.  When exposed to sunlight, patients should wear protective clothing, sunglasses, and sunscreen.  The patient was instructed to call the office immediately if the following severe adverse effects occur:  hearing changes, easy bruising/bleeding, severe headache, or vision changes.  The patient verbalized understanding of the proper use and possible adverse effects of tetracycline.  All of the patient's questions and concerns were addressed. Patient understands to avoid pregnancy while on therapy due to potential birth defects.
Topical Retinoids Recommendations: Rec OTC adapalene gel 0.1% (i.e differin) if Rx is not covered.
Benzoyl Peroxide Pregnancy And Lactation Text: This medication is Pregnancy Category C. It is unknown if benzoyl peroxide is excreted in breast milk.
Topical Sulfur Applications Counseling: Topical Sulfur Counseling: Patient counseled that this medication may cause skin irritation or allergic reactions.  In the event of skin irritation, the patient was advised to reduce the amount of the drug applied or use it less frequently.   The patient verbalized understanding of the proper use and possible adverse effects of topical sulfur application.  All of the patient's questions and concerns were addressed.
Isotretinoin Counseling: Patient should get monthly blood tests, not donate blood, not drive at night if vision affected, not share medication, and not undergo elective surgery for 6 months after tx completed. Side effects reviewed, pt to contact office should one occur.
Birth Control Pills Pregnancy And Lactation Text: This medication should be avoided if pregnant and for the first 30 days post-partum.
Detail Level: Detailed

## 2024-12-29 DIAGNOSIS — R07.0 THROAT PAIN: ICD-10-CM

## 2024-12-30 NOTE — TELEPHONE ENCOUNTER
This refill request is being sent to the provider for the following reason:  []Patient has not had an appointment within the past 12 months but has made an appointment on: ___  [x]Medication is not within protocol - Claritin D  []Patient did not complete follow up recommendations  []Other: ___    Last office visit was:  5/24/24

## 2025-01-06 RX ORDER — LORATADINE, PSEUDOEPHEDRINE SULFATE 5; 120 MG/1; MG/1
1 TABLET, FILM COATED, EXTENDED RELEASE ORAL EVERY 12 HOURS
Qty: 60 TABLET | Refills: 0 | OUTPATIENT
Start: 2025-01-06

## 2025-02-25 ENCOUNTER — RX ONLY (RX ONLY)
Age: 25
End: 2025-02-25

## 2025-02-25 RX ORDER — SPIRONOLACTONE 100 MG/1
TABLET, FILM COATED ORAL
Qty: 30 | Refills: 4 | Status: ERX

## 2025-06-23 ENCOUNTER — APPOINTMENT (OUTPATIENT)
Dept: URBAN - METROPOLITAN AREA CLINIC 244 | Age: 25
Setting detail: DERMATOLOGY
End: 2025-06-24

## 2025-06-23 DIAGNOSIS — L81.4 OTHER MELANIN HYPERPIGMENTATION: ICD-10-CM

## 2025-06-23 DIAGNOSIS — L81.3 CAFÉ AU LAIT SPOTS: ICD-10-CM

## 2025-06-23 DIAGNOSIS — Z12.83 ENCOUNTER FOR SCREENING FOR MALIGNANT NEOPLASM OF SKIN: ICD-10-CM

## 2025-06-23 DIAGNOSIS — L70.0 ACNE VULGARIS: ICD-10-CM

## 2025-06-23 DIAGNOSIS — D22 MELANOCYTIC NEVI: ICD-10-CM

## 2025-06-23 PROBLEM — D22.9 MELANOCYTIC NEVI, UNSPECIFIED: Status: ACTIVE | Noted: 2025-06-23

## 2025-06-23 PROCEDURE — OTHER PRESCRIPTION MEDICATION MANAGEMENT: OTHER

## 2025-06-23 PROCEDURE — OTHER COUNSELING: OTHER

## 2025-06-23 PROCEDURE — 99214 OFFICE O/P EST MOD 30 MIN: CPT

## 2025-06-23 PROCEDURE — OTHER PRESCRIPTION: OTHER

## 2025-06-23 PROCEDURE — OTHER ADDITIONAL NOTES: OTHER

## 2025-06-23 PROCEDURE — OTHER MIPS QUALITY: OTHER

## 2025-06-23 RX ORDER — CLINDAMYCIN PHOSPHATE 10 MG/ML
LOTION TOPICAL DAILY
Qty: 60 | Refills: 11 | Status: ERX

## 2025-06-23 ASSESSMENT — LOCATION DETAILED DESCRIPTION DERM
LOCATION DETAILED: GLABELLA
LOCATION DETAILED: LEFT INFERIOR LATERAL LOWER BACK
LOCATION DETAILED: NASAL DORSUM
LOCATION DETAILED: LEFT CHIN

## 2025-06-23 ASSESSMENT — LOCATION ZONE DERM
LOCATION ZONE: FACE
LOCATION ZONE: NOSE
LOCATION ZONE: TRUNK

## 2025-06-23 ASSESSMENT — LOCATION SIMPLE DESCRIPTION DERM
LOCATION SIMPLE: GLABELLA
LOCATION SIMPLE: NOSE
LOCATION SIMPLE: CHIN
LOCATION SIMPLE: LEFT LOWER BACK

## 2025-07-07 ENCOUNTER — APPOINTMENT (OUTPATIENT)
Dept: URBAN - METROPOLITAN AREA CLINIC 244 | Age: 25
Setting detail: DERMATOLOGY
End: 2025-07-09

## 2025-07-07 DIAGNOSIS — L70.0 ACNE VULGARIS: ICD-10-CM

## 2025-07-07 PROCEDURE — OTHER PRESCRIPTION: OTHER

## 2025-07-07 PROCEDURE — OTHER PRESCRIPTION MEDICATION MANAGEMENT: OTHER

## 2025-07-07 PROCEDURE — OTHER COUNSELING: OTHER

## 2025-07-07 PROCEDURE — OTHER ADDITIONAL NOTES: OTHER

## 2025-07-07 PROCEDURE — 99214 OFFICE O/P EST MOD 30 MIN: CPT

## 2025-07-07 PROCEDURE — OTHER MIPS QUALITY: OTHER

## 2025-07-07 RX ORDER — DOXYCYCLINE HYCLATE 100 MG/1
CAPSULE, GELATIN COATED ORAL
Qty: 14 | Refills: 2 | Status: ERX | COMMUNITY
Start: 2025-07-07

## 2025-07-07 ASSESSMENT — LOCATION SIMPLE DESCRIPTION DERM
LOCATION SIMPLE: CHIN
LOCATION SIMPLE: GLABELLA

## 2025-07-07 ASSESSMENT — LOCATION DETAILED DESCRIPTION DERM
LOCATION DETAILED: LEFT CHIN
LOCATION DETAILED: GLABELLA

## 2025-07-07 ASSESSMENT — LOCATION ZONE DERM: LOCATION ZONE: FACE

## (undated) NOTE — LETTER
Name:  Kina Low Year:  11th Grade Class: Student ID No.:   Address:  [address] [place] [address] Phone:  [phone] (home)  : [birth year]/ 14year old   Name Relationship Lgl [address] Work Phone Home Phone Mobile Phone   1.  [place] 15. Has any family member or relative  of heart problems or had an unexpected or unexplained sudden death before age 48?     15. Does anyone in your family have hypertrophic cardiomyopathy, Marfan syndrome, arrhythmogenic right ventricular cardiomyopat 35. Have you had a herpes or MRSA skin infection? 29. Have you ever had a head injury or concussion? 35. Have you ever had a hit or blow to the head that caused confusion, prolonged headache, or memory problems? 36.  Do you have a history of sei BMI-for-age based on BMI available as of 2/25/2019. female    Vision: R 20/    L 20/   Corrected:   Yes/No   MEDICAL NORMAL ABNORMAL FINDINGS   Appearance:  Marfan stigmata (kyphoscoliosis, high-arched palate, pectus excavatum,      arachnodactyly, arm spa [facility] Substance Testing Policy Consent to Random Testing   (This section for high school students only)   1904-6410 school term     As a prerequisite to participation in [employer] activities, we agree that I/our student will not use performance-enhancin

## (undated) NOTE — MR AVS SNAPSHOT
[facility]  [phone]               Thank you for choosing us for your health care visit with Efrem Caban. DO Landry.   We are glad to serve you and happy to provide you with this sum Tylenol suspension   Childrens Chewable   Jr.  Strength Chewable    Regular strength   Extra  Strength 36-47 lbs                                                      1&1/2 tsp           48-59 lbs                                                      2 tsp                              2               1 tablet  60-71 lbs Start to want both physical and emotional closeness in relationships. Social Development   Seeks friends who share the same beliefs, values, and interests. Friends become more important. Explores romantic and sexual behaviors with others.    May be influ These medications [id number] [place] - [address] AT [address] [place] [address], [phone], [address] [place] [address] She, [facility] [address]    Hours:  24-hours Phone:  [phone]    - Doxycycline Hyclate 100 MG Tabs family routines to help everyone lead healthier active lives.  Try:  o Eating breakfast everyday  o Eating low-fat dairy products like yogurt, milk, and cheese  o Regularly eating meals together as a family  o Limiting fast food, take out food, and eating o

## (undated) NOTE — LETTER
VACCINE ADMINISTRATION RECORD  PARENT / GUARDIAN APPROVAL  Date: 2019  Vaccine administered to: Mark Perez     : 2000    MRN: [id number]    A copy of the appropriate Centers for Disease Control and Prevention Vaccine Information statemen

## (undated) NOTE — LETTER
Name:  Unruly Shruthi Year:  12th Grade Class: Student ID No.:   Address:  [address] [place] [address] Phone:  [phone] (home) [phone] (work) :  18year old   Name Relationship Lgl [address] HEART HEALTH QUESTIONS ABOUT YOUR FAMILY Yes No   13.  Has any family member or relative  of heart problems or had an unexpected or unexplained sudden death before age 48?     15. Does anyone in your family have hypertrophic cardiomyopathy, Marfan syndr 32. Do you have any rashes, pressure sores, or other skin problems? 35. Have you had a herpes or MRSA skin infection? 29. Have you ever had a head injury or concussion?      35. Have you ever had a hit or blow to the head that caused confusion, prol BMI 19.66 kg/m²  29 %ile (Z= -0.56) based on CDC (Girls, 2-20 Years) BMI-for-age based on BMI available as of 3/9/2020. female    Vision: R 20/    L 20/   Corrected:   Yes/No   MEDICAL NORMAL ABNORMAL FINDINGS   Appearance:  Marfan stigmata (kyphoscoliosis [de-identified] Assistants or Advanced Nurse Practitioners to sign off on physicals.     [facility] Substance Testing Policy Consent to Random Testing   (This section for high school students only)   3577-3504 school term     As a prerequisite to participation in [facility]

## (undated) NOTE — LETTER
7/28/2020              Taya Bains        [address] [address]         To Whom It May Concern,    Tonia Barrett has recovered from COVID-19 and can return to work.  She easily fulfills CDC guidelines for recovery and does not need

## (undated) NOTE — LETTER
[facility] [employer] of Child Health Examination       Student's Name  Kathya Odonnell Title          MD                 Date  2/25/2019   Signature HEALTH HISTORY          TO BE COMPLETED AND SIGNED BY PARENT/GUARDIAN AND VERIFIED BY HEALTH CARE PROVIDER    ALLERGIES  (Food, drug, insect, other)  Patient has no known allergies.  MEDICATION  (List all prescribed or taken on a regular basis.)    Current Ear/Hearing problems? Yes   No  Information may be shared with appropriate personnel for health /educational purposes. Bone/Joint problem/injury/scoliosis?    Yes   No  Parent/Guardian Signature                                          Date     PHYSICAL Comments/Follow-up/Needs   Skin Yes  Endocrine Yes    Ears Yes                      Screen result: Gastrointestinal Yes    Eyes Yes     Screen result:   Genito-Urinary Yes  LMP   Nose Yes  Neurological Yes    Throat Yes  Musculoskeletal Yes    Mouth/Dental Rev 11/15                                                                    Printed by the [employer]

## (undated) NOTE — Clinical Note
Name:  Prince Dubin Year:  10th Grade Class: Student ID No.:   Address:  [address] [place] [address] Phone:  [phone] (home) [phone] (work) :  15year old   Name Relationship Lgnilda [address] 13. Does anyone in your family have a heart problem, pacemaker, or implanted defibrillator? 12. Has anyone in your family had unexplained fainting, seizures, or near drowning?      BONE AND JOINT QUESTIONS Yes No   17. Have you ever had an injury to a b after being hit or falling? 39.Have you ever been unable to move your arms / legs after being hit /fall? 40. Have you ever become ill while exercising in the heat?     41. Do you get frequent muscle cramps when exercising? 42.  Do you or someone · Murmurs (auscultation standing, supine, +/- Valsalva)  · Location of point of maximal impulse (PMI) Yes    Pulses Yes    Lungs Yes    Abdomen Yes    Genitourinary (males only)* N/A    Skin:  HSV, lesions suggestive of MRSA, tinea corporis Yes    Neurolog state series events or during the school day, and I/our student do/does hereby agree to submit to such testing and analysis by a certified laboratory.  We further understand and agree that the results of the performance-enhancing substance testing may be pr

## (undated) NOTE — Clinical Note
VACCINE ADMINISTRATION RECORD  PARENT / GUARDIAN APPROVAL  Date: 2017  Vaccine administered to: Tracy Rosas     : 2000    MRN: [id number]    A copy of the appropriate Centers for Disease Control and Prevention Vaccine Information statement

## (undated) NOTE — LETTER
2018              Marlene Walker ( 2000)         [address] [facility]       Immunization History   Administered Date(s) Administered   • DTAP 2000, 2000, 2002, 2003, 2007   • HEP A,

## (undated) NOTE — LETTER
[facility] [employer] of Child Health Examination       Student's Name  Myles Odonnell Title                           Date     Signature HEALTH HISTORY          TO BE COMPLETED AND SIGNED BY PARENT/GUARDIAN AND VERIFIED BY HEALTH CARE PROVIDER    ALLERGIES  (Food, drug, insect, other)  Patient has no known allergies.  MEDICATION  (List all prescribed or taken on a regular basis.)    Current Ear/Hearing problems? Yes   No  Information may be shared with appropriate personnel for health /educational purposes. Bone/Joint problem/injury/scoliosis?    Yes   No  Parent/Guardian Signature                                          Date     PHYSICAL LAB TESTS (Recommended) Date Results  Date Results   Hemoglobin or Hematocrit   Sickle Cell  (when indicated)     Urinalysis   Developmental Screening Tool     SYSTEM REVIEW Normal Comments/Follow-up/Needs  Normal Comments/Follow-up/Needs   Skin {YES:829[de-identified] On the basis of the examination on this day, I approve this child's participation in        (If No or Modified, please attach explanation.)  PHYSICAL EDUCATION    {DMG_Y/N/MODIFIED:1369::\"Yes\"}      INTERSCHOLASTIC SPORTS   {DEANDRE, Y/N/MODIFIED:1483::\"Y

## (undated) NOTE — LETTER
Name:  Rigo Finch Year:  11th Grade Class: Student ID No.:   Address:  [address] [place] [address] Phone:  [phone] (home)  : [birth year]/ 14year old   Name Relationship Lgl [address] Work Phone Home Phone Mobile Phone   1.  Rosie Stephens 15. Has any family member or relative  of heart problems or had an unexpected or unexplained sudden death before age 48?     15. Does anyone in your family have hypertrophic cardiomyopathy, Marfan syndrome, arrhythmogenic right ventricular cardiomyopat 35. Have you had a herpes or MRSA skin infection? 29. Have you ever had a head injury or concussion? 35. Have you ever had a hit or blow to the head that caused confusion, prolonged headache, or memory problems? 36.  Do you have a history of sei BMI-for-age based on BMI available as of 2/25/2019. female    Vision: R 20/    L 20/   Corrected:   Yes/No   MEDICAL NORMAL ABNORMAL FINDINGS   Appearance:  Marfan stigmata (kyphoscoliosis, high-arched palate, pectus excavatum,      arachnodactyly, arm spa [facility] Substance Testing Policy Consent to Random Testing   (This section for high school students only)   9796-1332 school term     As a prerequisite to participation in [employer] activities, we agree that I/our student will not use performance-enhancin

## (undated) NOTE — LETTER
Name:  Moises Satish Year:  11th Grade Class: Student ID No.:   Address:  [address] [place] [address] Phone:  [phone] (home)  : [age]year old   Name Relationship Lgl [address] Work Phone Home Phone Mobile Phone   1.  Ange Dillon 15. Has any family member or relative  of heart problems or had an unexpected or unexplained sudden death before age 48?     15. Does anyone in your family have hypertrophic cardiomyopathy, Marfan syndrome, arrhythmogenic right ventricular cardiomyopat 35. Have you had a herpes or MRSA skin infection? 29. Have you ever had a head injury or concussion? 35. Have you ever had a hit or blow to the head that caused confusion, prolonged headache, or memory problems? 36.  Do you have a history of sei BMI-for-age based on BMI available as of 2/25/2019. female    Vision: R 20/    L 20/   Corrected:   Yes/No   MEDICAL NORMAL ABNORMAL FINDINGS   Appearance:  Marfan stigmata (kyphoscoliosis, high-arched palate, pectus excavatum,      arachnodactyly, arm spa [facility] Substance Testing Policy Consent to Random Testing   (This section for high school students only)   3683-6424 school term     As a prerequisite to participation in [employer] activities, we agree that I/our student will not use performance-enhancin

## (undated) NOTE — LETTER
VACCINE ADMINISTRATION RECORD  PARENT / GUARDIAN APPROVAL  Date: 2020  Vaccine administered to: Monica Demarco     : 2000    MRN: [id number]    A copy of the appropriate Centers for Disease Control and Prevention Vaccine Information statement